# Patient Record
Sex: MALE | Race: WHITE | URBAN - METROPOLITAN AREA
[De-identification: names, ages, dates, MRNs, and addresses within clinical notes are randomized per-mention and may not be internally consistent; named-entity substitution may affect disease eponyms.]

---

## 2018-02-26 ENCOUNTER — OFFICE VISIT (OUTPATIENT)
Dept: INTERNAL MEDICINE CLINIC | Age: 33
End: 2018-02-26

## 2018-02-26 VITALS
RESPIRATION RATE: 16 BRPM | WEIGHT: 164 LBS | SYSTOLIC BLOOD PRESSURE: 111 MMHG | HEART RATE: 82 BPM | DIASTOLIC BLOOD PRESSURE: 77 MMHG | HEIGHT: 70 IN | TEMPERATURE: 98.4 F | BODY MASS INDEX: 23.48 KG/M2 | OXYGEN SATURATION: 100 %

## 2018-02-26 DIAGNOSIS — K52.9 OSMOTIC DIARRHEA: ICD-10-CM

## 2018-02-26 DIAGNOSIS — E86.0 DEHYDRATION: ICD-10-CM

## 2018-02-26 DIAGNOSIS — M79.12 STERNOCLEIDOMASTOID MUSCLE TENDERNESS: Primary | ICD-10-CM

## 2018-02-26 RX ORDER — BACLOFEN 20 MG/1
20 TABLET ORAL 3 TIMES DAILY
Qty: 90 TAB | Refills: 1 | Status: SHIPPED | OUTPATIENT
Start: 2018-02-26 | End: 2018-03-13

## 2018-02-26 NOTE — PROGRESS NOTES
FAMILY MEDICINE CLINIC NOTE    S: The patient presents for establishment of care. States that he has dizziness, severe headache and a rush of blood to the head sometimes associated with vomiting. Happens approximately 3 times per month. Always happens in the monring within 10 minutes of waking up. Patient does not snore or have nocturnal apneic episodes. States that he has persistent daytime somnolence and never sleeps well. The patient also reports hemorrhoids that come and go, reports diarrhea, the hemorrhoids are helped somewhat by preparation H. Notes a spot in the throat area for the last 8 months, no difficulty swallowing no fever. Patient does not not consuming water much at all, he mainly drinks soft drinks and coffee. 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 Denies slurred speech, focal weakness, visual or auditory changes    No current outpatient prescriptions on file prior to visit. No current facility-administered medications on file prior to visit. History reviewed. No pertinent past medical history. Social History     Social History    Marital status: SINGLE     Spouse name: N/A    Number of children: N/A    Years of education: N/A     Occupational History    Not on file.      Social History Main Topics    Smoking status: Current Every Day Smoker     Packs/day: 1.00    Smokeless tobacco: Never Used    Alcohol use 2.4 oz/week     2 Glasses of wine, 2 Shots of liquor per week    Drug use: 8.00 per week     Special: Marijuana    Sexual activity: Not Currently     Other Topics Concern    Not on file     Social History Narrative    No narrative on file       Family History   Problem Relation Age of Onset    Hypertension Mother     Arthritis-osteo Mother     Hypertension Father     Arthritis-osteo Father     Arthritis-osteo Sister        O:  Visit Vitals    /77 (BP 1 Location: Left arm, BP Patient Position: Sitting)  Pulse 82    Temp 98.4 °F (36.9 °C) (Oral)    Resp 16    Ht 5' 10\" (1.778 m)    Wt 164 lb (74.4 kg)    SpO2 100%    BMI 23.53 kg/m2     NAD, comfortable  RRR, no murmurs  CTABL, no wheezing/ronchi/rales  abd soft ND NT normoactive BS  Significant spasms of bilateral sternocleidomastoid muscle and bilateral superior trapezius    27 y.o. male      ICD-10-CM ICD-9-CM    1. Sternocleidomastoid muscle tenderness M79.1 729.1 baclofen (LIORESAL) 20 mg tablet  Recommended deep tissue massage  Follow up in 2 weeks, if no improvement will consider sleep study. 2. Osmotic diarrhea K52.9 787.91 Recommended cessation of soft drinks  Follow up in 2 weeks, if no improvement will consider gastroenterology referral   3.  Dehydration E86.0 276.51 Recommended limitation of caffeinated beverages to no more than 1 per day  Recommended increase in daily water consumption

## 2018-02-26 NOTE — MR AVS SNAPSHOT
[facility] 
 
 
 [address] [place] [address] 
[phone] Patient: Emerson Fall MRN: [id number] ENK:0/0/9345 Visit Information Date & Time Provider Department Dept. Phone Encounter #  
 2/26/2018  5:30 PM [address] [phone] [id number] Your Appointments 3/12/2018  5:45 PM  
Office Visit with MD [address] [facility]) Appt Note: 2 week f/u  
 [address]  
  
   
 [address] [facility] Upcoming Health Maintenance Date Due DTaP/Tdap/Td series (1 - Tdap) 7/4/2004 Allergies as of 2/26/2018  Review Complete On: 2/26/2018 By: Luisa Islas LPN Not on File Current Immunizations  Never Reviewed No immunizations on file. Not reviewed this visit You Were Diagnosed With   
  
 Codes Comments Sternocleidomastoid muscle tenderness    -  Primary ICD-10-CM: M79.1 ICD-9-CM: 729.1 Osmotic diarrhea     ICD-10-CM: K52.9 ICD-9-CM: 787.91 Dehydration     ICD-10-CM: E86.0 ICD-9-CM: 276.51 Vitals BP Pulse Temp Resp Height(growth percentile) Weight(growth percentile) 111/77 (BP 1 Location: Left arm, BP Patient Position: Sitting) 82 98.4 °F (36.9 °C) (Oral) 16 5' 10\" (1.778 m) 164 lb (74.4 kg) SpO2 BMI Smoking Status 100% 23.53 kg/m2 Current Every Day Smoker Vitals History BMI and BSA Data Body Mass Index Body Surface Area  
 23.53 kg/m 2 1.92 m 2 Preferred Pharmacy Pharmacy Name Phone [facility] [facility], [address] [place] [address] [phone] Your Updated Medication List  
  
   
This list is accurate as of 2/26/18  6:18 PM.  Always use your most recent med list.  
  
  
  
  
 baclofen 20 mg tablet Commonly known as:  LIORESAL Take 1 Tab by mouth three (3) times daily. Prescriptions Sent to Pharmacy Refills  
 baclofen (LIORESAL) 20 mg tablet 1 Sig: Take 1 Tab by mouth three (3) times daily. Class: Normal  
 Pharmacy: [facility] [address] [place] [address], [address] [place] [address] #: [phone] Route: Oral  
  
Patient Instructions Deep tissue neck massage 30 min once per week for at least 2 sessions at [facility], first floor of [place] [facility] [employer] Stop all sweetened drinks No more than 1 cup of coffee daily Baclofen (muscle relaxer) at night before bed Drink at least 6-8 bottles of water daily Come back in 2 weeks. Introducing [facility] & HEALTH SERVICES! [employer] introduces [employer] patient portal. Now you can access parts of your medical record, email your doctor's office, and request medication refills online. 1. In your internet browser, go to [email or web address] [employer] 2. Click on the First Time User? Click Here link in the Sign In box. You will see the New Member Sign Up page. 3. Enter your [employer] Access Code exactly as it appears below. You will not need to use this code after youve completed the sign-up process. If you do not sign up before the expiration date, you must request a new code. · [employer] Access Code: [email or web address] Expires: 5/27/2018  6:11 PM 
 
4. Enter the last four digits of your Social Security Number (xxxx) and Date of Birth (mm/dd/yyyy) as indicated and click Submit. You will be taken to the next sign-up page. 5. Create a [employer] ID. This will be your [employer] login ID and cannot be changed, so think of one that is secure and easy to remember. 6. Create a [employer] password. You can change your password at any time. 7. Enter your Password Reset Question and Answer. This can be used at a later time if you forget your password. 8. Enter your e-mail address. You will receive e-mail notification when new information is available in [address] 9. Click Sign Up. You can now view and download portions of your medical record. 10. Click the Download Summary menu link to download a portable copy of your medical information. If you have questions, please visit the Frequently Asked Questions section of the [employer] website. Remember, [employer] is NOT to be used for urgent needs. For medical emergencies, dial 911. Now available from your iPhone and Android! Please provide this summary of care documentation to your next provider. If you have any questions after today's visit, please call [phone].

## 2018-02-26 NOTE — PROGRESS NOTES
Airam Turner presents today for   Chief Complaint   Patient presents with    New Patient     Pt stated he did not have a previous provider. Airam Turner preferred language for health care discussion is english/other. Is someone accompanying this pt? Yes; mother    Is the patient using any DME equipment during [address]? No     Depression Screening:  PHQ over the last two weeks 2/26/2018   Little interest or pleasure in doing things Not at all   Feeling down, depressed or hopeless Not at all   Total Score PHQ 2 0       Learning Assessment:  Completed     Abuse Screening:  Completed    Fall Risk  No flowsheet data found. Health Maintenance reviewed and discussed per provider. Yes    Airam Turner is due for tdap. Please order/place referral if appropriate. Advance Directive:  1. Do you have an advance directive in place? Patient Reply: No     2. If not, would you like material regarding how to put one in place? Patient Reply: Yes    Coordination of Care:  1. Have you been to the ER, urgent care clinic since your last 60 days? Hospitalized since your last 60 days ? [facility] for hand pain x right     2. Have you seen or consulted any other health care providers outside of the [address] [place] [address] since your last 60 days?  No

## 2018-02-26 NOTE — PATIENT INSTRUCTIONS
Deep tissue neck massage 30 min once per week for at least 2 sessions at [facility], first floor of [place] by [employer]    Stop all sweetened drinks    No more than 1 cup of coffee daily    Baclofen (muscle relaxer) at night before bed    Drink at least 6-8 bottles of water daily    Come back in 2 weeks.

## 2018-03-12 ENCOUNTER — OFFICE VISIT (OUTPATIENT)
Dept: INTERNAL MEDICINE CLINIC | Age: 33
End: 2018-03-12

## 2018-03-12 VITALS
DIASTOLIC BLOOD PRESSURE: 68 MMHG | SYSTOLIC BLOOD PRESSURE: 116 MMHG | HEIGHT: 70 IN | TEMPERATURE: 98.5 F | BODY MASS INDEX: 23.48 KG/M2 | WEIGHT: 164 LBS | RESPIRATION RATE: 16 BRPM | HEART RATE: 76 BPM | OXYGEN SATURATION: 98 %

## 2018-03-12 DIAGNOSIS — M62.838 NECK MUSCLE SPASM: Primary | ICD-10-CM

## 2018-03-12 RX ORDER — CYCLOBENZAPRINE HCL 10 MG
10 TABLET ORAL
Qty: 90 TAB | Refills: 1 | Status: SHIPPED | OUTPATIENT
Start: 2018-03-12

## 2018-03-12 NOTE — PROGRESS NOTES
Arabella Palacio presents today for   Chief Complaint   Patient presents with    Muscle Pain     Sternocleidomastoid muscle tenderness       Arabella Palacio preferred language for health care discussion is english/other. Is someone accompanying this pt? No     Is the patient using any DME equipment during OV? No     Depression Screening:  PHQ over the last two weeks 3/12/2018 2/26/2018   Little interest or pleasure in doing things Several days Not at all   Feeling down, depressed or hopeless Several days Not at all   Total Score PHQ 2 2 0       Learning Assessment:  Learning Assessment 2/26/2018   PRIMARY LEARNER Patient   HIGHEST LEVEL OF EDUCATION - PRIMARY LEARNER  2 YEARS OF COLLEGE   BARRIERS PRIMARY LEARNER NONE   CO-LEARNER CAREGIVER No   PRIMARY LANGUAGE ENGLISH   LEARNER PREFERENCE PRIMARY DEMONSTRATION   ANSWERED BY patient   RELATIONSHIP SELF       Abuse Screening:  Abuse Screening Questionnaire 2/26/2018   Do you ever feel afraid of your partner? N   Are you in a relationship with someone who physically or mentally threatens you? N   Is it safe for you to go home? Y       Fall Risk  No flowsheet data found. Health Maintenance reviewed and discussed per provider. Yes    Arabella Palacio is due for pneumo and tdap. Please order/place referral if appropriate. Advance Directive:  1. Do you have an advance directive in place? Patient Reply: No     2. If not, would you like material regarding how to put one in place? Patient Reply:  No     Coordination of Care:  1. Have you been to the ER, urgent care clinic since your last visit? Hospitalized since your last visit? No     2. Have you seen or consulted any other health care providers outside of the [facility] since your last visit?   No

## 2018-03-12 NOTE — ACP (ADVANCE CARE PLANNING)
Advance Directive:  1. Do you have an advance directive in place? Patient Reply: No     2. If not, would you like material regarding how to put one in place?  Patient Reply:  No

## 2018-03-12 NOTE — PROGRESS NOTES
FAMILY MEDICINE CLINIC NOTE    S: The patient presents for follow up on his dizzy spells and headaches secondary to neck muscle spasms. The patient states that his morning dizzy spells have resolved since eliminating sweet drinks, increasing water consumption and decreasing caffeine consumption    His headaches have decreased in intensity but are still there, he has yet to get deep tissue massge as recommended. Baclofen helps with the headaches buit he feels like the medication gives him dry mouth    Current Outpatient Prescriptions on File Prior to Visit   Medication Sig Dispense Refill    baclofen (LIORESAL) 20 mg tablet Take 1 Tab by mouth three (3) times daily. 90 Tab 1     No current facility-administered medications on file prior to visit. History reviewed. No pertinent past medical history. Social History     Social History    Marital status: SINGLE     Spouse name: N/A    Number of children: N/A    Years of education: N/A     Occupational History    Not on file. Social History Main Topics    Smoking status: Current Every Day Smoker     Packs/day: 1.00    Smokeless tobacco: Never Used    Alcohol use 2.4 oz/week     2 Glasses of wine, 2 Shots of liquor per week    Drug use: 8.00 per week     Special: Marijuana    Sexual activity: Not Currently     Other Topics Concern    Not on file     Social History Narrative       Family History   Problem Relation Age of Onset    Hypertension Mother     Arthritis-osteo Mother     Hypertension Father     Arthritis-osteo Father     Arthritis-osteo Sister        O:  Visit Vitals    /68 (BP 1 Location: Left arm, BP Patient Position: Sitting)    Pulse 76    Temp 98.5 °F (36.9 °C) (Oral)    Resp 16    Ht 5' 10\" (1.778 m)    Wt 164 lb (74.4 kg)    SpO2 98%    BMI 23.53 kg/m2     NAD, comfortable  RRR, no murmurs  CTABL, no wheezing/ronchi/rales  Spasms and mod TTP of bilateral superior trapezius and sternocleidomastoid muscles. 27 y.o. male      ICD-10-CM ICD-9-CM    1.  Neck muscle spasm M62.838 728.85 cyclobenzaprine (FLEXERIL) 10 mg tablet  Discontinue baclofen

## 2018-03-12 NOTE — MR AVS SNAPSHOT
[facility] 
 
 
 [address] [facility] 83 [address] 
[phone] Patient: Radha Duran MRN: [id number] RAP:7/8/[birth year] Visit Information Date & Time Provider Department Dept. Phone Encounter #  
 3/12/2018  5:30 PM [facility] [phone] [id number] Upcoming Health Maintenance Date Due Pneumococcal 19-64 Medium Risk (1 of 1 - PPSV23) 7/4/2002 DTaP/Tdap/Td series (1 - Tdap) 7/4/2004 Allergies as of 3/12/2018  Review Complete On: 3/12/2018 By: Rustam Silva LPN No Known Allergies Current Immunizations  Never Reviewed No immunizations on file. Not reviewed this visit You Were Diagnosed With   
  
 Codes Comments Neck muscle spasm    -  Primary ICD-10-CM: M63.254 ICD-9-CM: 728.85 Vitals BP Pulse Temp Resp Height(growth percentile) Weight(growth percentile) 116/68 (BP 1 Location: Left arm, BP Patient Position: Sitting) 76 98.5 °F (36.9 °C) (Oral) 16 5' 10\" (1.778 m) 164 lb (74.4 kg) SpO2 BMI Smoking Status 98% 23.53 kg/m2 Current Every Day Smoker Vitals History BMI and BSA Data Body Mass Index Body Surface Area  
 23.53 kg/m 2 1.92 m 2 Preferred Pharmacy Pharmacy Name Phone [facility] 3 [address] [phone] Your Updated Medication List  
  
   
This list is accurate as of 3/12/18  6:02 PM.  Always use your most recent med list.  
  
  
  
  
 baclofen 20 mg tablet Commonly known as:  LIORESAL Take 1 Tab by mouth three (3) times daily. cyclobenzaprine 10 mg tablet Commonly known as:  FLEXERIL Take 1 Tab by mouth three (3) times daily as needed for Muscle Spasm(s). Prescriptions Sent to Pharmacy  Refills  
 cyclobenzaprine (FLEXERIL) 10 mg tablet 1  
 Sig: Take 1 Tab by mouth three (3) times daily as needed for Muscle Spasm(s). Class: Normal  
 Pharmacy: [facility], [address] [place] [address] #: [phone] Route: Oral  
  
Introducing [facility] & HEALTH SERVICES! [employer] introduces [employer] patient portal. Now you can access parts of your medical record, email your doctor's office, and request medication refills online. 1. In your internet browser, go to [email or web address] 2. Click on the First Time User? Click Here link in the Sign In box. You will see the New Member Sign Up page. 3. Enter your [employer] Access Code exactly as it appears below. You will not need to use this code after youve completed the sign-up process. If you do not sign up before the expiration date, you must request a new code. · [employer] Access Code: [email or web address] Expires: 5/27/2018  7:11 PM 
 
4. Enter the last four digits of your Social Security Number (xxxx) and Date of Birth (mm/dd/yyyy) as indicated and click Submit. You will be taken to the next sign-up page. 5. Create a [employer] ID. This will be your [employer] login ID and cannot be changed, so think of one that is secure and easy to remember. 6. Create a [employer] password. You can change your password at any time. 7. Enter your Password Reset Question and Answer. This can be used at a later time if you forget your password. 8. Enter your e-mail address. You will receive e-mail notification when new information is available in [address] 9. Click Sign Up. You can now view and download portions of your medical record. 10. Click the Download Summary menu link to download a portable copy of your medical information. If you have questions, please visit the Frequently Asked Questions section of the [employer] website. Remember, [employer] is NOT to be used for urgent needs. For medical emergencies, dial 911. Now available from your iPhone and Android! Please provide this summary of care documentation to your next provider. If you have any questions after today's visit, please call [phone].

## 2018-06-06 DIAGNOSIS — M79.12 STERNOCLEIDOMASTOID MUSCLE TENDERNESS: ICD-10-CM

## 2018-06-06 RX ORDER — BACLOFEN 20 MG/1
TABLET ORAL
Qty: 90 TAB | Refills: 0 | Status: SHIPPED | OUTPATIENT
Start: 2018-06-06

## 2018-06-11 ENCOUNTER — TELEPHONE (OUTPATIENT)
Dept: INTERNAL MEDICINE CLINIC | Age: 33
End: 2018-06-11

## 2018-06-11 ENCOUNTER — HOSPITAL ENCOUNTER (OUTPATIENT)
Dept: LAB | Age: 33
Discharge: HOME | End: 2018-06-11
Payer: COMMERCIAL

## 2018-06-11 ENCOUNTER — OFFICE VISIT (OUTPATIENT)
Dept: INTERNAL MEDICINE CLINIC | Age: 33
End: 2018-06-11

## 2018-06-11 VITALS
TEMPERATURE: 97.8 F | DIASTOLIC BLOOD PRESSURE: 75 MMHG | HEIGHT: 70 IN | RESPIRATION RATE: 17 BRPM | SYSTOLIC BLOOD PRESSURE: 114 MMHG | BODY MASS INDEX: 23.11 KG/M2 | WEIGHT: 161.4 LBS | HEART RATE: 70 BPM | OXYGEN SATURATION: 99 %

## 2018-06-11 DIAGNOSIS — Z11.3 SCREEN FOR STD (SEXUALLY TRANSMITTED DISEASE): ICD-10-CM

## 2018-06-11 DIAGNOSIS — Z00.00 HEALTH CARE MAINTENANCE: Primary | ICD-10-CM

## 2018-06-11 PROCEDURE — 86592 SYPHILIS TEST NON-TREP QUAL: CPT | Performed by: FAMILY MEDICINE

## 2018-06-11 PROCEDURE — 87389 HIV-1 AG W/HIV-1&-2 AB AG IA: CPT | Performed by: FAMILY MEDICINE

## 2018-06-11 NOTE — MR AVS SNAPSHOT
[facility] 
 
 
 [address] [facility] 83 [address] 
[phone] Patient: Rivas Schmidt MRN: [id number] EIY:4[birth year] Visit Information Date & Time Provider Department Dept. Phone Encounter #  
 6/11/2018  5:30 PM Radha Campo, [address] [phone] [id number] Upcoming Health Maintenance Date Due Pneumococcal 19-64 Medium Risk (1 of 1 - PPSV23) 7/4/2002 DTaP/Tdap/Td series (1 - Tdap) 7/4/2004 Influenza Age 5 to Adult 8/1/2018 Allergies as of 6/11/2018  Review Complete On: 6/11/2018 By: Radha Campo MD  
 No Known Allergies Current Immunizations  Never Reviewed No immunizations on file. Not reviewed this visit You Were Diagnosed With   
  
 Codes Comments Health care maintenance    -  Primary ICD-10-CM: Z00.00 ICD-9-CM: V70.0 Screen for STD (sexually transmitted disease)     ICD-10-CM: Z11.3 ICD-9-CM: V74.5 Vitals BP Pulse Temp Resp Height(growth percentile) Weight(growth percentile) 114/75 (BP 1 Location: Left arm, BP Patient Position: Sitting) 70 97.8 °F (36.6 °C) (Oral) 17 5' 10\" (1.778 m) 161 lb 6.4 oz (73.2 kg) SpO2 BMI Smoking Status 99% 23.16 kg/m2 Current Every Day Smoker Vitals History BMI and BSA Data Body Mass Index Body Surface Area  
 23.16 kg/m 2 1.9 m 2 Preferred Pharmacy Pharmacy Name Phone [facility] 933 [facility], [address] [place] [address] [phone] Your Updated Medication List  
  
   
This list is accurate as of 6/11/18  5:46 PM.  Always use your most recent med list.  
  
  
  
  
 baclofen 20 mg tablet Commonly known as:  LIORESAL  
TAKE 1 TABLET BY MOUTH THREE TIMES DAILY  
  
 cyclobenzaprine 10 mg tablet Commonly known as:  FLEXERIL Take 1 Tab by mouth three (3) times daily as needed for Muscle Spasm(s).   
  
  
  
  
To-Do List   
 06/11/2018 Lab:  CHLAMYDIA/GC PCR   
  
 06/11/2018 Lab:  HIV 1/2 AG/AB, 4TH GENERATION,W RFLX CONFIRM   
  
 06/11/2018 Lab:  RPR Introducing [facility] & HEALTH SERVICES! [employer] introduces [employer] patient portal. Now you can access parts of your medical record, email your doctor's office, and request medication refills online. 1. In your internet browser, go to [email or web address] 2. Click on the First Time User? Click Here link in the Sign In box. You will see the New Member Sign Up page. 3. Enter your [employer] Access Code exactly as it appears below. You will not need to use this code after youve completed the sign-up process. If you do not sign up before the expiration date, you must request a new code. · [employer] Access Code: [email or web address] Expires: 9/9/2018  5:44 PM 
 
4. Enter the last four digits of your Social Security Number (xxxx) and Date of Birth (mm/dd/yyyy) as indicated and click Submit. You will be taken to the next sign-up page. 5. Create a [employer] ID. This will be your [employer] login ID and cannot be changed, so think of one that is secure and easy to remember. 6. Create a [employer] password. You can change your password at any time. 7. Enter your Password Reset Question and Answer. This can be used at a later time if you forget your password. 8. Enter your e-mail address. You will receive e-mail notification when new information is available in [address] 9. Click Sign Up. You can now view and download portions of your medical record. 10. Click the Download Summary menu link to download a portable copy of your medical information. If you have questions, please visit the Frequently Asked Questions section of the [employer] website. Remember, [employer] is NOT to be used for urgent needs. For medical emergencies, dial 911. Now available from your iPhone and Android! Please provide this summary of care documentation to your next provider. If you have any questions after today's visit, please call [phone].

## 2018-06-11 NOTE — PROGRESS NOTES
FAMILY MEDICINE CLINIC NOTE    S: The patient presents for a general physical. He has no specific somatic complaints, no significant PMH, taking no medications, NKDA. Current Outpatient Prescriptions on File Prior to Visit   Medication Sig Dispense Refill    baclofen (LIORESAL) 20 mg tablet TAKE 1 TABLET BY MOUTH THREE TIMES DAILY 90 Tab 0    cyclobenzaprine (FLEXERIL) 10 mg tablet Take 1 Tab by mouth three (3) times daily as needed for Muscle Spasm(s). 90 Tab 1     No current facility-administered medications on file prior to visit. History reviewed. No pertinent past medical history. Social History     Social History    Marital status: SINGLE     Spouse name: N/A    Number of children: N/A    Years of education: N/A     Occupational History    Not on file.      Social History Main Topics    Smoking status: Current Every Day Smoker     Packs/day: 1.00    Smokeless tobacco: Never Used    Alcohol use 2.4 oz/week     2 Glasses of wine, 2 Shots of liquor per week    Drug use: 8.00 per week     Special: Marijuana    Sexual activity: Not Currently     Other Topics Concern    Not on file     Social History Narrative       Family History   Problem Relation Age of Onset    Hypertension Mother     Arthritis-osteo Mother     Hypertension Father     Arthritis-osteo Father    [de-identified] Arthritis-osteo Sister        O:  Visit Vitals    /75 (BP 1 Location: Left arm, BP Patient Position: Sitting)    Pulse 70    Temp 97.8 °F (36.6 °C) (Oral)    Resp 17    Ht 5' 10\" (1.778 m)    Wt 161 lb 6.4 oz (73.2 kg)    SpO2 99%    BMI 23.16 kg/m2     Physical Examination: General appearance - alert, well appearing, and in no distress  Mental status - alert, oriented to person, place, and time  Eyes - pupils equal and reactive, extraocular eye movements intact  Ears - bilateral TM's and external ear canals normal  Nose - normal and patent, no erythema, discharge or polyps  Mouth - mucous membranes moist, pharynx normal without lesions  Neck - supple, no significant adenopathy  Lymphatics - no palpable lymphadenopathy, no hepatosplenomegaly  Chest - clear to auscultation, no wheezes, rales or rhonchi, symmetric air entry  Heart - normal rate, regular rhythm, normal S1, S2, no murmurs, rubs, clicks or gallops  Abdomen - soft, nontender, nondistended, no masses or organomegaly  Back exam - full range of motion, no tenderness, palpable spasm or pain on motion  Neurological - alert, oriented, normal speech, no focal findings or movement disorder noted  Musculoskeletal - no joint tenderness, deformity or swelling  Extremities - peripheral pulses normal, no pedal edema, no clubbing or cyanosis  Skin - normal coloration and turgor, no rashes, no suspicious skin lesions noted      27 y.o. male      ICD-10-CM ICD-9-CM    1. Health care maintenance Z00.00 V70.0 Encouraged smoking cessation   2.  Screen for STD (sexually transmitted disease) Z11.3 V74.5 HIV 1/2 AG/AB, 4TH GENERATION,W RFLX CONFIRM      RPR      CHLAMYDIA/GC PCR

## 2018-06-11 NOTE — TELEPHONE ENCOUNTER
Contacted pt. 2 pt identifiers confirmed. Pt has appt this afternoon at 1730 with Dr. Abdoulaye Castro. Pt asked if he could come in earlier. Pt states yes, he will come in around 1700. Verbalized understanding. No further questions or concerns at this time.

## 2018-06-11 NOTE — PROGRESS NOTES
ROOM # 8    Nikita Pires presents today for   Chief Complaint   Patient presents with    Complete Physical       Nikita Pires preferred language for health care discussion is english/other. Is someone accompanying this pt? no    Is the patient using any DME equipment during OV? no    Depression Screening:  PHQ over the last two weeks 6/11/2018 3/12/2018 2/26/2018   Little interest or pleasure in doing things Not at all Several days Not at all   Feeling down, depressed or hopeless Not at all Several days Not at all   Total Score PHQ 2 0 2 0       Learning Assessment:  Learning Assessment 2/26/2018   PRIMARY LEARNER Patient   HIGHEST LEVEL OF EDUCATION - PRIMARY LEARNER  2 YEARS [facility] PRIMARY LEARNER NONE   CO-LEARNER CAREGIVER No   PRIMARY LANGUAGE ENGLISH   LEARNER PREFERENCE PRIMARY DEMONSTRATION   ANSWERED BY patient   RELATIONSHIP SELF       Abuse Screening:  Abuse Screening Questionnaire 2/26/2018   Do you ever feel afraid of your partner? N   Are you in a relationship with someone who physically or mentally threatens you? N   Is it safe for you to go home? Y       Fall Risk  No flowsheet data found. Health Maintenance reviewed and discussed per provider. Yes    Nikita Pires is due for   Health Maintenance Due   Topic Date Due    Pneumococcal 19-64 Medium Risk (1 of 1 - PPSV23) 07/04/2002    DTaP/Tdap/Td series (1 - Tdap) 07/04/2004   HM to be reviewed by provider    Please order/place referral if appropriate. Advance Directive:  1. Do you have an advance directive in place? Patient Reply: no    2. If not, would you like material regarding how to put one in place? Patient Reply: no    Coordination of Care:  1. Have you been to the ER, urgent care clinic since your last visit? Hospitalized since your last visit? no    2. Have you seen or consulted any other health care providers outside of the [address] [place] [address] since your last visit?  Include any pap smears or colon screening.  no

## 2018-06-12 ENCOUNTER — HOSPITAL ENCOUNTER (OUTPATIENT)
Dept: LAB | Age: 33
Discharge: HOME | End: 2018-06-12

## 2018-06-12 DIAGNOSIS — Z11.3 SCREEN FOR STD (SEXUALLY TRANSMITTED DISEASE): ICD-10-CM

## 2018-06-12 LAB — RPR SER QL: NONREACTIVE

## 2018-06-13 LAB
HIV 1+2 AB+HIV1 P24 AG SERPL QL IA: NONREACTIVE
HIV12 RESULT COMMENT, HHIVC: NORMAL

## 2019-07-15 ENCOUNTER — OFFICE VISIT (OUTPATIENT)
Dept: INTERNAL MEDICINE CLINIC | Age: 34
End: 2019-07-15

## 2019-07-15 ENCOUNTER — HOSPITAL ENCOUNTER (OUTPATIENT)
Dept: LAB | Age: 34
Discharge: HOME | End: 2019-07-15
Payer: COMMERCIAL

## 2019-07-15 VITALS
SYSTOLIC BLOOD PRESSURE: 121 MMHG | OXYGEN SATURATION: 98 % | WEIGHT: 159 LBS | BODY MASS INDEX: 22.76 KG/M2 | DIASTOLIC BLOOD PRESSURE: 80 MMHG | HEIGHT: 70 IN | HEART RATE: 78 BPM | TEMPERATURE: 97.4 F | RESPIRATION RATE: 16 BRPM

## 2019-07-15 DIAGNOSIS — Z00.00 ROUTINE GENERAL MEDICAL EXAMINATION AT A HEALTH CARE FACILITY: Primary | ICD-10-CM

## 2019-07-15 DIAGNOSIS — Z23 ENCOUNTER FOR IMMUNIZATION: ICD-10-CM

## 2019-07-15 DIAGNOSIS — R42 DIZZINESS: ICD-10-CM

## 2019-07-15 DIAGNOSIS — R06.02 SOB (SHORTNESS OF BREATH): ICD-10-CM

## 2019-07-15 DIAGNOSIS — R00.2 PALPITATIONS: ICD-10-CM

## 2019-07-15 DIAGNOSIS — Z00.00 ROUTINE GENERAL MEDICAL EXAMINATION AT A HEALTH CARE FACILITY: ICD-10-CM

## 2019-07-15 DIAGNOSIS — G47.19 EXCESSIVE DAYTIME SLEEPINESS: ICD-10-CM

## 2019-07-15 LAB
ALBUMIN SERPL-MCNC: 4.4 G/DL (ref 3.4–5)
ALBUMIN/GLOB SERPL: 1.4 {RATIO} (ref 0.8–1.7)
ALP SERPL-CCNC: 109 U/L (ref 45–117)
ALT SERPL-CCNC: 23 U/L (ref 16–61)
ANION GAP SERPL CALC-SCNC: 5 MMOL/L (ref 3–18)
AST SERPL-CCNC: 17 U/L (ref 15–37)
BILIRUB SERPL-MCNC: 0.4 MG/DL (ref 0.2–1)
BUN SERPL-MCNC: 15 MG/DL (ref 7–18)
BUN/CREAT SERPL: 10 (ref 12–20)
CALCIUM SERPL-MCNC: 9 MG/DL (ref 8.5–10.1)
CHLORIDE SERPL-SCNC: 107 MMOL/L (ref 100–108)
CHOLEST SERPL-MCNC: 199 MG/DL
CO2 SERPL-SCNC: 27 MMOL/L (ref 21–32)
CREAT SERPL-MCNC: 1.54 MG/DL (ref 0.6–1.3)
ERYTHROCYTE [DISTWIDTH] IN BLOOD BY AUTOMATED COUNT: 13.3 % (ref 11.6–14.5)
GLOBULIN SER CALC-MCNC: 3.1 G/DL (ref 2–4)
GLUCOSE SERPL-MCNC: 85 MG/DL (ref 74–99)
HBA1C MFR BLD: 5.1 % (ref 4.2–5.6)
HCT VFR BLD AUTO: 43.4 % (ref 36–48)
HDLC SERPL-MCNC: 50 MG/DL (ref 40–60)
HDLC SERPL: 4 {RATIO} (ref 0–5)
HGB BLD-MCNC: 14.8 G/DL (ref 13–16)
LDLC SERPL CALC-MCNC: 123.4 MG/DL (ref 0–100)
LIPID PROFILE,FLP: ABNORMAL
MAGNESIUM SERPL-MCNC: 2.1 MG/DL (ref 1.6–2.6)
MCH RBC QN AUTO: 30.5 PG (ref 24–34)
MCHC RBC AUTO-ENTMCNC: 34.1 G/DL (ref 31–37)
MCV RBC AUTO: 89.3 FL (ref 74–97)
PLATELET # BLD AUTO: 345 K/UL (ref 135–420)
PMV BLD AUTO: 10.7 FL (ref 9.2–11.8)
POTASSIUM SERPL-SCNC: 3.9 MMOL/L (ref 3.5–5.5)
PROT SERPL-MCNC: 7.5 G/DL (ref 6.4–8.2)
RBC # BLD AUTO: 4.86 M/UL (ref 4.7–5.5)
SODIUM SERPL-SCNC: 139 MMOL/L (ref 136–145)
T4 FREE SERPL-MCNC: 1 NG/DL (ref 0.7–1.5)
TRIGL SERPL-MCNC: 128 MG/DL (ref ?–150)
TSH SERPL DL<=0.05 MIU/L-ACNC: 0.42 UIU/ML (ref 0.36–3.74)
VLDLC SERPL CALC-MCNC: 25.6 MG/DL
WBC # BLD AUTO: 11.5 K/UL (ref 4.6–13.2)

## 2019-07-15 PROCEDURE — 83036 HEMOGLOBIN GLYCOSYLATED A1C: CPT

## 2019-07-15 PROCEDURE — 80053 COMPREHEN METABOLIC PANEL: CPT

## 2019-07-15 PROCEDURE — 36415 COLL VENOUS BLD VENIPUNCTURE: CPT

## 2019-07-15 PROCEDURE — 84443 ASSAY THYROID STIM HORMONE: CPT

## 2019-07-15 PROCEDURE — 83735 ASSAY OF MAGNESIUM: CPT

## 2019-07-15 PROCEDURE — 80061 LIPID PANEL: CPT

## 2019-07-15 PROCEDURE — 85027 COMPLETE CBC AUTOMATED: CPT

## 2019-07-15 PROCEDURE — 84439 ASSAY OF FREE THYROXINE: CPT

## 2019-07-15 NOTE — PROGRESS NOTES
Presented for Pneumococcal-23 and Tdap Vaccine. Administered in left deltoid without complaints. Pt observed for 5 min. No adverse reaction noted.  Pt left office in stable condition

## 2019-07-15 NOTE — PROGRESS NOTES
HISTORY OF PRESENT ILLNESS  Carlton Ramey is a 37 y.o. male. HPI  Mr. Alma Morales presents for a physical. He is not fasting today. 1) Girlfriend and 1-year have strep. Denies sore throat. 2) He c/o frequent (2-4x per week x 1 year) episodes of heart racing, darker vision, cloudy hearing, and SOB. Episodes typically last 5-10 minutes but have lasted as long as 30-45 minutes. Often symptoms are accompanied by a headache. Symptoms all seem to resolve without sequelae spontaneously. He has not previously received evaluation of this. Episodes are always in the morning after waking. They sometimes occur after he has risen from bed but have also occurred while he is still laying down. Review of Systems   Constitutional: Negative for chills. HENT: Negative for sore throat. Respiratory: Positive for shortness of breath. Cardiovascular: Positive for chest pain and palpitations. Neurological: Positive for dizziness and headaches. Visit Vitals  /80 (BP 1 Location: Right arm, BP Patient Position: Sitting)   Pulse 78   Temp 97.4 °F (36.3 °C) (Oral)   Resp 16   Ht 5' 10\" (1.778 m)   Wt 159 lb (72.1 kg)   SpO2 98%   BMI 22.81 kg/m²       Physical Exam   Constitutional: He is oriented to person, place, and time. He appears well-developed and well-nourished. No distress. HENT:   Head: Normocephalic and atraumatic. Right Ear: Tympanic membrane, external ear and ear canal normal.   Left Ear: Tympanic membrane, external ear and ear canal normal.   Nose: Nose normal.   Mouth/Throat: Uvula is midline, oropharynx is clear and moist and mucous membranes are normal. No oropharyngeal exudate, posterior oropharyngeal edema, posterior oropharyngeal erythema or tonsillar abscesses. Eyes: Pupils are equal, round, and reactive to light. Conjunctivae are normal. No scleral icterus. Neck: Neck supple. Cardiovascular: Normal rate, regular rhythm and normal heart sounds. Exam reveals no gallop.    No murmur heard.  Pulses:       Dorsalis pedis pulses are 2+ on the right side, and 2+ on the left side. Posterior tibial pulses are 2+ on the right side, and 2+ on the left side. No pedal edema. Pulmonary/Chest: Effort normal and breath sounds normal. No respiratory distress. He has no decreased breath sounds. He has no wheezes. He has no rhonchi. He has no rales. Lymphadenopathy:        Head (right side): No submandibular and no tonsillar adenopathy present. Head (left side): No submandibular and no tonsillar adenopathy present. He has no cervical adenopathy. Right: No supraclavicular adenopathy present. Left: No supraclavicular adenopathy present. Neurological: He is alert and oriented to person, place, and time. Skin: Skin is warm and dry. Psychiatric: He has a normal mood and affect. His speech is normal.       ASSESSMENT and PLAN  Diagnoses and all orders for this visit:    1. Routine general medical examination at a health care facility  -     CBC W/O DIFF; Future  -     METABOLIC PANEL, COMPREHENSIVE; Future  -     LIPID PANEL; Future  -     HEMOGLOBIN A1C W/O EAG; Future    2. Palpitations  -     AMB POC EKG ROUTINE W/ 12 LEADS, INTER & REP   - EKG interp: NSR (60 bpm), Short ID syndrome.    - Self interp: NSR (60 bpm). No arrhythmia.   -     T4, FREE; Future  -     TSH 3RD GENERATION; Future  -     MAGNESIUM; Future  -     CARDIAC EVENT MONITOR; Future  -     REFERRAL TO SLEEP STUDIES    3. SOB (shortness of breath)  -     AMB POC EKG ROUTINE W/ 12 LEADS, INTER & REP  -     CARDIAC EVENT MONITOR; Future  -     REFERRAL TO SLEEP STUDIES    4. Dizziness  -     AMB POC EKG ROUTINE W/ 12 LEADS, INTER & REP  -     CARDIAC EVENT MONITOR; Future    5. Excessive daytime sleepiness  -     REFERRAL TO SLEEP STUDIES    6.  Encounter for immunization  -     TETANUS, DIPHTHERIA TOXOIDS AND ACELLULAR PERTUSSIS VACCINE (TDAP), IN INDIVIDS. >=7, IM  -     PNEUMOCOCCAL POLYSACCHARIDE VACCINE, 23-VALENT, ADULT OR IMMUNOSUPPRESSED PT DOSE,  -     MA IMMUNIZ ADMIN,1 SINGLE/COMB VAC/TOXOID      Follow-up and Dispositions    · Return for with results.

## 2019-07-15 NOTE — PROGRESS NOTES
ROOM # 13    Beryle Simpler presents today for   Chief Complaint   Patient presents with    Physical       Beryle Simpler preferred language for health care discussion is english/other. Is someone accompanying this pt? No    Is the patient using any DME equipment during OV? No    Depression Screening:  3 most recent [facility] [place] [facility] Screens 7/15/2019 6/11/2018 3/12/2018 2/26/2018   Little interest or pleasure in doing things Not at all Not at all Several days Not at all   Feeling down, depressed, irritable, or hopeless Not at all Not at all Several days Not at all   Total Score PHQ 2 0 0 2 0       Learning Assessment:  Learning Assessment 2/26/2018   PRIMARY LEARNER Patient   HIGHEST LEVEL OF EDUCATION - PRIMARY LEARNER  2 YEARS [facility] PRIMARY LEARNER NONE   CO-LEARNER CAREGIVER No   PRIMARY LANGUAGE ENGLISH   LEARNER PREFERENCE PRIMARY DEMONSTRATION   ANSWERED BY patient   RELATIONSHIP SELF       Abuse Screening:  Abuse Screening Questionnaire 2/26/2018   Do you ever feel afraid of your partner? N   Are you in a relationship with someone who physically or mentally threatens you? N   Is it safe for you to go home? Y       Fall Risk  No flowsheet data found. Health Maintenance reviewed and discussed per provider. Yes    Beryle Simpler is due for   Health Maintenance Due   Topic Date Due    Pneumococcal 0-64 years (1 of 1 - PPSV23) 07/04/1989    DTaP/Tdap/Td series (1 - Tdap) 07/04/2004     Please order/place referral if appropriate. Advance Directive:  1. Do you have an advance directive in place? Patient Reply: NO    2. If not, would you like material regarding how to put one in place? Patient Reply: NO    Coordination of Care:  1. Have you been to the ER, urgent care clinic since your last visit? Hospitalized since your last visit? NO    2. Have you seen or consulted any other health care providers outside of the [address] [place] [address] since your last visit? Include any colon screening. No

## 2019-10-07 ENCOUNTER — OFFICE VISIT (OUTPATIENT)
Dept: PULMONOLOGY | Age: 34
End: 2019-10-07

## 2019-10-07 VITALS
RESPIRATION RATE: 18 BRPM | BODY MASS INDEX: 23.51 KG/M2 | WEIGHT: 164.2 LBS | HEART RATE: 77 BPM | OXYGEN SATURATION: 99 % | SYSTOLIC BLOOD PRESSURE: 129 MMHG | DIASTOLIC BLOOD PRESSURE: 81 MMHG | HEIGHT: 70 IN | TEMPERATURE: 98.2 F

## 2019-10-07 DIAGNOSIS — Z72.821 POOR SLEEP HYGIENE: ICD-10-CM

## 2019-10-07 DIAGNOSIS — F12.90 MARIJUANA USE: ICD-10-CM

## 2019-10-07 DIAGNOSIS — F45.8 BRUXISM: ICD-10-CM

## 2019-10-07 DIAGNOSIS — Z78.9 ALCOHOL USE: ICD-10-CM

## 2019-10-07 DIAGNOSIS — G89.29 OTHER CHRONIC PAIN: ICD-10-CM

## 2019-10-07 DIAGNOSIS — G47.19 EXCESSIVE DAYTIME SLEEPINESS: Primary | ICD-10-CM

## 2019-10-07 DIAGNOSIS — R00.2 PALPITATION: ICD-10-CM

## 2019-10-07 DIAGNOSIS — G47.00 INSOMNIA, UNSPECIFIED TYPE: ICD-10-CM

## 2019-10-07 DIAGNOSIS — F17.218 CIGARETTE NICOTINE DEPENDENCE WITH OTHER NICOTINE-INDUCED DISORDER: ICD-10-CM

## 2019-10-07 DIAGNOSIS — Z65.9 OTHER SOCIAL STRESSOR: ICD-10-CM

## 2019-10-07 NOTE — PROGRESS NOTES
Pt taken in stable condition via [facility] to [facility] in [place], [place] [facility]  Pulmonary, Critical Care, and Sleep Medicine    Office Progress Note- Initial Evaluation      Primary Care Physician: David Morales     Reason for Visit:  Evaluation for possible sleep breathing disorder    Assessment:  1. Excessive Daytime Sleepiness (EDS)- suspect related to multiple factors. I have had an in depth discussion with the patient. He is able to fall asleep but not maintain sleep. He does have some anatomic features that may suggest MARIA. However I suspect that most of his sleep fragmentation is coming from his chronic pain. I have also suggested to him that for him to smoke 1 PPD and marijuana TID would suggest he is more stressed and has more anxiety than he realizes. He is also working nearly every day. All of these issues contribute to inadequate sleep time, and sleep fragmentation. It would not be unreasonable to pursue an HST but addressing his physical pain,smoking, stressors, possible anxiety and night-time caffeine intake may be more beneficial. At the end of our discussion, the patient tentatively agreed to HST - pending the cost.   2. Palpitations- possible panic attacks vs arrhythmia vs other- patient reports sudden onset, more so in the morning and is associated with flush feeling, ringing in ears and tunnel vision. Sounds like hyperventilation. Unclear if this is related to stimulate use, smoking, anxiety, other??  3. Insomnia- sleep maintenance  4. Excessive smoking and caffeine intake at night  5. Bruxism  6. Marijuana use- TID  7. Nicotine dependence: 1PPD  8. Chronic pain: muscle and joints which does disturb sleep  9. Social stressors  10. Alcohol use       Plan:  · Smoking cessation encouraged  · Attempt to optimize pain issues  · Attempt to address social stresors  · Schedule patient for home sleep study for further evaluation.   · Potential consequences of untreated sleep apnea, and/or excessive daytime sleepiness were discussed with the patient. · Educational materials provided. · Treatment options including CPAP, dental appliance, weight reduction measures, positional therapy, surgeries etc were discussed. · Healthy lifestyle changes to include weight loss and exercise discussed. · Healthy sleep habits were reviewed and encouraged. ·  and workplace safety reviewed and discussed as appropriate. Drowsy and/or inattentive driving should be avoided. · Follow-up in PRN , sooner should new symptoms or problems arise. · Follow up with Primary Care Provider (PCP) as directed and for routine health care maintenance. · Follow up with me either after HST or PRN if patient does not wish to pursue sleep testing at this time. History of Present Illness: Mr. Saleem Castro is a 37 y.o. male patient who reports for evaluation with difficulty maintaining sleep. The history was provided by the patient. Occupation:                         Work Schedule: 8490-2919 M-S- works most days with an occasional day off    Driving:  yes  Drowsy Driving: is not reported. Motor vehicle accident(s) associated with drowsy driving have not occurred. Snoring:  He is not aware of any snoring. His bed partner has not noted snoring    Fatigue: This is a Chronic problem which has been ongoing for years. Dental: Teeth clenching or grinding is reported. Naps: are reported when he can but this is rare. Leg Symptoms/Pain: He does not have unpleasant or crawling sensation in legs or strong urge to move when inactive. He struggles to find a position of comfort so he will move a lot in his sleep. GERD: is reported but not common. Mood: Somewhere between happy and sad. He has stress with his live-in girlfriend and father of his child, they may be . Sleep-Wake History:     Estimates sleeping approximately 5-6 hours per night/day. Reports sleeping in a Bed with 1 pillows. He gets into bed at approximately 2300. Once in bed,he tries to go to sleep. It usually takes up to 20-60 minutes to fall asleep after going to bed. Reports waking up to use the bathroom 0 times nightly. Pain, typically does disturb their sleep. Usually pain is related shoulders, neck and hip pain. This is often an every night occurrence. Vivid dreams are not reported. He normally awakens with an alarm (usually he will wake right before alarm goes off)  to start their day at 0630. He  typically gets out of bed after hitting the snooze button at least twice. .    Waking up with a morning headache is reported at least 1-2 times weekly    Awakening with a dry mouth is reported. Symptom(s) suggestive of cataplexy are not reported. Sleep paralysis are not reported. Hypnagogic and/or hypnopompic hallucinations are not reported. Sleep walking is not  reported currently. He use to sleep walk as a child and once locked himself out of the house. Sleep talking is reported. Other unusual and/or parasomnia behaviors are not reported. Family Sleep History:  - Father: MARIA- uses CPAP        Stop [place] Head 10/7/2019   Does the patient snore loudly (louder than talking or loud enough to be heard through closed doors)? 0   Does the patient often feel tired, fatigued, or sleepy during the daytime, even after a \"good\" night's sleep? 1   Has anyone ever observed the patient stop breathing during their sleep?  0   Does the patient have or are they being treated for high blood pressure? 0   Is the patient's BMI greater than 35? 0   Is your neck circumference greater than 17 inches (Male) or 16 inches (Female)? 0   Is the patient older than 48? 0   Is the patient male?  1   MARIA Score 2       3 most recent PHQ Screens 10/7/2019   Little interest or pleasure in doing things Not at all   Feeling down, depressed, irritable, or hopeless Not at all   Total Score PHQ 2 0       [place] Scale 10/7/2019   Sitting and Reading 0   Watching TV 0   Sitting, inactive in a public place (e.g. a movie theater or meeting) 0   As a passenger in a car for an hour, without a break 0   Lying down to rest in the afternoon, when circumstances permit 1   Sitting and talking to someone 0   Sitting quietly after lunch without alcohol 1   In a car, while stopped for a few minutes in traffic 0   [place] Sleepiness Score 2        Neck circ. in \"inches\": 14         Past Medical History:  Past Medical History:   Diagnosis Date    Chronic pain     back       Past Surgical History:  Past Surgical History:   Procedure Laterality Date    HX WISDOM TEETH EXTRACTION         Family History:  Family History   Problem Relation Age of Onset    Hypertension Mother     Arthritis-osteo Mother     Hypertension Father     Arthritis-osteo Father    Karie Peabody Arthritis-osteo Sister        Social History:  Social History     Tobacco Use    Smoking status: Current Every Day Smoker     Packs/day: 1.00     Years: 20.00     Pack years: 20.00    Smokeless tobacco: Never Used   Substance Use Topics    Alcohol use: Yes     Alcohol/week: 4.0 standard drinks     Types: 2 Glasses of wine, 2 Shots of liquor per week    Drug use: Yes     Frequency: 8.0 times per week     Types: Marijuana        Caffeine Amount Time of last Intake Comments   Coffee 3-4/day 1200    Soda With diner     Tea 1 G QOD    Sweet    Energy Drinks None     Over- the - counter stimulant pills None     Other Substances      Alcohol 1 -2 /2days [address]   Tobacco 1 PPD x 20 Y  No vap   Drugs Marijuana: TID bowl For relaxing   Other: None         Medications:  Current Outpatient Medications on File Prior to Visit   Medication Sig Dispense Refill    baclofen (LIORESAL) 20 mg tablet TAKE 1 TABLET BY MOUTH THREE TIMES DAILY 90 Tab 0    cyclobenzaprine (FLEXERIL) 10 mg tablet Take 1 Tab by mouth three (3) times daily as needed for Muscle Spasm(s). 90 Tab 1     No current facility-administered medications on file prior to visit. Allergy:  No Known Allergies    Review of Systems  General ROS: positive for  - fatigue and sleep disturbance  negative for - chills, fever, hot flashes, malaise, night sweats, weight gain or weight loss  ENT ROS: negative for - epistaxis, hearing change, nasal congestion, nasal discharge, nasal polyps, oral lesions, sinus pain, sneezing, sore throat, tinnitus, vertigo, visual changes or vocal changes  Hematological and Lymphatic ROS: negative for - bleeding problems, blood clots, bruising, jaundice, pallor or swollen lymph nodes  Endocrine ROS: negative for - polydipsia/polyuria, skin changes, temperature intolerance or unexpected weight changes  Respiratory ROS: no cough,   or wheezing + episodes dyspnea at rest and with activity  Cardiovascular ROS: no chest pain or dyspnea on exertion- positive for report of palpitations with flushed red face feeling, rapid breathing, tunnel vision and ringing in ears  Gastrointestinal ROS: no abdominal pain, change in bowel habits, or black or bloody stools  Genito-Urinary ROS: no dysuria, trouble voiding, or hematuria  Musculoskeletal ROS: positive for - joint pain and muscle pain  Neurological ROS: no TIA or stroke symptoms  Dermatological ROS: negative for - pruritus, rash or skin lesion changes   Psychological ROS: as above   Otherwise negative. Physical Exam:  Blood pressure 129/81, pulse 77, temperature 98.2 °F (36.8 °C), temperature source Oral, resp. rate 18, height 5' 10\" (1.778 m), weight 74.5 kg (164 lb 3.2 oz), SpO2 99 %. on RA, Body mass index is 23.56 kg/m². General: No distress, acyanotic, appears stated age, cooperative, pleasant  HEENT: PERRL, EOMI, throat without erythema or exudate, Tongue- dental indention on tongue, Mallampati's score 2+, Uvula- midline, Tonsils- not seen.  Mouth is proportionally small  Neck: Supple,  no abnormally enlarged lymph nodes, thyroid is not enlarged, non-tender, no carotid bruits, no JVD  Chest: Normal.  Lungs: Moderate air entry, clear to auscultation bilaterally,   Heart: Regular rate and rhythm, S1S2 present, without murmur. Abdomen: Flate, bowel sounds normoactive, abdomen is soft without significant tenderness, or guarding. Extremity: Negative for cyanosis, edema, or clubbing. Skin: Skin color, texture, turgor normal. No rashes or lesions. Neurological: CN 2-12 grossly intact, normal muscle tone. Data Reviewed:  CBC:   Lab Results   Component Value Date/Time    WBC 11.5 07/15/2019 01:15 PM    HGB 14.8 07/15/2019 01:15 PM    HCT 43.4 07/15/2019 01:15 PM    PLATELET 485 03/19/7231 01:15 PM    MCV 89.3 07/15/2019 01:15 PM       BMP:   Lab Results   Component Value Date/Time    Sodium 139 07/15/2019 01:15 PM    Potassium 3.9 07/15/2019 01:15 PM    Chloride 107 07/15/2019 01:15 PM    CO2 27 07/15/2019 01:15 PM    Anion gap 5 07/15/2019 01:15 PM    Glucose 85 07/15/2019 01:15 PM    BUN 15 07/15/2019 01:15 PM    Creatinine 1.54 (H) 07/15/2019 01:15 PM    BUN/Creatinine ratio 10 (L) 07/15/2019 01:15 PM    GFR est AA >60 07/15/2019 01:15 PM    GFR est non-AA 51 (L) 07/15/2019 01:15 PM    Calcium 9.0 07/15/2019 01:15 PM        TSH:  Lab Results   Component Value Date/Time    TSH 0.42 07/15/2019 01:15 PM       Imaging:  [x]I have personally reviewed the patients radiographs section   No results found for this or any previous visit. No results found for this or any previous visit. Historical Sleep Testing Data: No Testing To Date.           Елена Barker DO, FCCP  Pulmonary, Sleep and Critical Care Medicine

## 2019-10-07 NOTE — PROGRESS NOTES
Luis Enrique Whiting presents today for   Chief Complaint   Patient presents with    Irregular Heart Beat    Shortness of Breath    Other     excessive daytime sleepiness       Is someone accompanying this pt? No     Is the patient using any DME equipment during OV? No     -DME Company n/a     Depression Screening:  3 most recent PHQ Screens 10/7/2019   Little interest or pleasure in doing things Not at all   Feeling down, depressed, irritable, or hopeless Not at all   Total Score PHQ 2 0       Learning Assessment:  Learning Assessment 2/26/2018   PRIMARY LEARNER Patient   HIGHEST LEVEL OF EDUCATION - PRIMARY LEARNER  2 YEARS OF COLLEGE   [facility]RIERS PRIMARY LEARNER NONE   CO-LEARNER CAREGIVER No   PRIMARY LANGUAGE ENGLISH   LEARNER PREFERENCE PRIMARY DEMONSTRATION   ANSWERED BY patient   RELATIONSHIP SELF       Abuse Screening:  Abuse Screening Questionnaire 2/26/2018   Do you ever feel afraid of your partner? N   Are you in a relationship with someone who physically or mentally threatens you? N   Is it safe for you to go home? Y       Fall Risk  No flowsheet data found. Coordination of Care:  1. Have you been to the ER, urgent care clinic since your last visit? Hospitalized since your last visit? No    2. Have you seen or consulted any other health care providers outside of the [address] [place] [address] since your last visit? Include any pap smears or colon screening.  No

## 2019-10-07 NOTE — LETTER
10/8/19 Patient: Jose E Rao YOB: 1985 Date of Visit: 10/7/2019 RUBA Mares 
[address] [facility] [address] [id number] VIA In Basket Dear RUBA Mares, Thank you for referring Mr. Jose E Rao to [address] [place] [address] for evaluation. My notes for this consultation are attached. If you have questions, please do not hesitate to call me. I look forward to following your patient along with you.  
 
 
Sincerely, 
 
Candy Gentle, DO

## 2020-12-22 ENCOUNTER — OFFICE VISIT (OUTPATIENT)
Dept: INTERNAL MEDICINE CLINIC | Age: 35
End: 2020-12-22
Payer: COMMERCIAL

## 2020-12-22 ENCOUNTER — HOSPITAL ENCOUNTER (OUTPATIENT)
Dept: LAB | Age: 35
Discharge: HOME | End: 2020-12-22
Payer: COMMERCIAL

## 2020-12-22 VITALS
OXYGEN SATURATION: 97 % | WEIGHT: 170 LBS | BODY MASS INDEX: 24.34 KG/M2 | RESPIRATION RATE: 20 BRPM | SYSTOLIC BLOOD PRESSURE: 124 MMHG | DIASTOLIC BLOOD PRESSURE: 77 MMHG | TEMPERATURE: 98.1 F | HEIGHT: 70 IN | HEART RATE: 96 BPM

## 2020-12-22 DIAGNOSIS — N18.2 CKD (CHRONIC KIDNEY DISEASE) STAGE 2, GFR 60-89 ML/MIN: ICD-10-CM

## 2020-12-22 DIAGNOSIS — M77.11 EPICONDYLITIS, LATERAL, RIGHT: Primary | ICD-10-CM

## 2020-12-22 LAB
ANION GAP SERPL CALC-SCNC: 4 MMOL/L (ref 3–18)
APPEARANCE UR: CLEAR
BILIRUB UR QL: NEGATIVE
BUN SERPL-MCNC: 17 MG/DL (ref 7–18)
BUN/CREAT SERPL: 16 (ref 12–20)
CALCIUM SERPL-MCNC: 9.2 MG/DL (ref 8.5–10.1)
CHLORIDE SERPL-SCNC: 107 MMOL/L (ref 100–111)
CO2 SERPL-SCNC: 29 MMOL/L (ref 21–32)
COLOR UR: YELLOW
CREAT SERPL-MCNC: 1.09 MG/DL (ref 0.6–1.3)
GLUCOSE SERPL-MCNC: 75 MG/DL (ref 74–99)
GLUCOSE UR STRIP.AUTO-MCNC: NEGATIVE MG/DL
HGB UR QL STRIP: NEGATIVE
KETONES UR QL STRIP.AUTO: NEGATIVE MG/DL
LEUKOCYTE ESTERASE UR QL STRIP.AUTO: NEGATIVE
NITRITE UR QL STRIP.AUTO: NEGATIVE
PH UR STRIP: 7 [PH] (ref 5–8)
POTASSIUM SERPL-SCNC: 4.1 MMOL/L (ref 3.5–5.5)
PROT UR STRIP-MCNC: NEGATIVE MG/DL
SODIUM SERPL-SCNC: 140 MMOL/L (ref 136–145)
SP GR UR REFRACTOMETRY: 1.02 (ref 1–1.03)
UROBILINOGEN UR QL STRIP.AUTO: 1 EU/DL (ref 0.2–1)

## 2020-12-22 PROCEDURE — 80048 BASIC METABOLIC PNL TOTAL CA: CPT

## 2020-12-22 PROCEDURE — 36415 COLL VENOUS BLD VENIPUNCTURE: CPT

## 2020-12-22 PROCEDURE — 81003 URINALYSIS AUTO W/O SCOPE: CPT

## 2020-12-22 PROCEDURE — 99214 OFFICE O/P EST MOD 30 MIN: CPT | Performed by: INTERNAL MEDICINE

## 2020-12-22 RX ORDER — IBUPROFEN 200 MG
TABLET ORAL
COMMUNITY

## 2020-12-22 NOTE — PROGRESS NOTES
Progress Note    Patient: Shawnee Philippe               Sex: male                  YOB: 1985      Age:  38 y.o.                    HPI:     Shawnee Philippe is a 38 y.o. male who has been seen for Rt arm pain . Present x 3 months. . It began as a pain in his elbow. He wakes up in pain and has to move it to stretch it out . He is a  for a construction company . He denies any injuries. He also has some pain in the Rt deltoid area    Past Medical History:   Diagnosis Date    Chronic pain     back       Past Surgical History:   Procedure Laterality Date    HX WISDOM TEETH EXTRACTION         Family History   Problem Relation Age of Onset    Hypertension Mother    [facility] Arthritis-osteo Mother     Hypertension Father     Arthritis-osteo Father     Arthritis-osteo Sister        Social History     Socioeconomic History    Marital status: SINGLE     Spouse name: Not on file    Number of children: Not on file    Years of education: Not on file    Highest education level: Not on file   Tobacco Use    Smoking status: Current Every Day Smoker     Packs/day: 1.00     Years: 20.00     Pack years: 20.00    Smokeless tobacco: Never Used   Substance and Sexual Activity    Alcohol use: Yes     Alcohol/week: 4.0 standard drinks     Types: 2 Glasses of wine, 2 Shots of liquor per week    Drug use: Yes     Frequency: 8.0 times per week     Types: Marijuana    Sexual activity: Not Currently         Current Outpatient Medications:     ibuprofen (MOTRIN) 200 mg tablet, Take  by mouth., Disp: , Rfl:     baclofen (LIORESAL) 20 mg tablet, TAKE 1 TABLET BY MOUTH THREE TIMES DAILY, Disp: 90 Tab, Rfl: 0    cyclobenzaprine (FLEXERIL) 10 mg tablet, Take 1 Tab by mouth three (3) times daily as needed for Muscle Spasm(s). , Disp: 90 Tab, Rfl: 1     No Known Allergies    Review of Systems   Constitutional: Negative for chills and fever. HENT: Negative for hearing loss. Eyes: Negative.     Respiratory: Negative for cough and shortness of breath. Cardiovascular: Negative for chest pain and PND. Gastrointestinal: Negative. Genitourinary: Negative. Musculoskeletal: Positive for joint pain. Neurological: Negative for dizziness and loss of consciousness. Psychiatric/Behavioral: Negative for depression. The patient is not nervous/anxious. Physical Exam:      Visit Vitals  /77 (BP 1 Location: Left arm, BP Patient Position: Sitting)   Pulse 96   Temp 98.1 °F (36.7 °C) (Oral)   Resp 20   Ht 5' 10\" (1.778 m)   Wt 170 lb (77.1 kg)   SpO2 97%   BMI 24.39 kg/m²       Physical Exam  Constitutional:       Appearance: Normal appearance. Cardiovascular:      Rate and Rhythm: Normal rate and regular rhythm. Pulmonary:      Effort: Pulmonary effort is normal.      Breath sounds: Normal breath sounds. Musculoskeletal:      Comments: Tender over Rt lateral epicondyle elbow   Neurological:      Mental Status: He is alert. Psychiatric:         Mood and Affect: Mood normal.         Behavior: Behavior normal.          Labs Reviewed:  Lab results reviewed. For significant abnormal values and values requiring intervention, see assessment and plan. Assessment/Plan       ICD-10-CM ICD-9-CM    1. Epicondylitis, lateral, right  M77.11 726.32 REFERRAL TO ORTHOPEDICS   2.  CKD (chronic kidney disease) stage 2, GFR 60-89 ml/min  N18.2 585.2 URINALYSIS W/ RFLX MICROSCOPIC      METABOLIC PANEL, BASIC   creatinine 1.5          Dustin Moran MD

## 2020-12-22 NOTE — PROGRESS NOTES
Caleb Styles is a 38 y.o. male (: 1985) presenting to address:    Chief Complaint   Patient presents with    Elbow Pain     patient c/o RT elbow pain x 3-4 months      pain scale 5/10       Vitals:    20 1449   BP: 124/77   Pulse: 96   Resp: 20   Temp: 98.1 °F (36.7 °C)   TempSrc: Oral   SpO2: 97%   Weight: 170 lb (77.1 kg)   Height: 5' 10\" (1.778 m)   PainSc:   5   PainLoc: Elbow       Hearing/Vision:   No exam data present    Learning Assessment:     Learning Assessment 2018   PRIMARY LEARNER Patient   HIGHEST LEVEL OF EDUCATION - PRIMARY LEARNER  2 YEARS OF COLLEGE   BARRIERS PRIMARY LEARNER NONE   CO-LEARNER CAREGIVER No   PRIMARY LANGUAGE ENGLISH   LEARNER PREFERENCE PRIMARY DEMONSTRATION   ANSWERED BY patient   RELATIONSHIP SELF     Depression Screening:     3 most recent PHQ Screens 2020   Little interest or pleasure in doing things Not at all   Feeling down, depressed, irritable, or hopeless Not at all   Total Score PHQ 2 0     Fall Risk Assessment:   No flowsheet data found. Abuse Screening:     Abuse Screening Questionnaire 2018   Do you ever feel afraid of your partner? N   Are you in a relationship with someone who physically or mentally threatens you? N   Is it safe for you to go home? Y     Coordination of Care Questionaire:   1. Have you been to the ER, urgent care clinic since your last visit? Hospitalized since your last visit? NO    2. Have you seen or consulted any other health care providers outside of the [address] [place] [address] since your last visit? Include any pap smears or colon screening. NO    Advanced Directive:   1. Do you have an Advanced Directive? NO    2. Would you like information on Advanced Directives?  NO

## 2021-06-24 ENCOUNTER — OFFICE VISIT (OUTPATIENT)
Dept: INTERNAL MEDICINE CLINIC | Age: 36
End: 2021-06-24
Payer: COMMERCIAL

## 2021-06-24 ENCOUNTER — HOSPITAL ENCOUNTER (OUTPATIENT)
Dept: LAB | Age: 36
Discharge: HOME | End: 2021-06-24
Payer: COMMERCIAL

## 2021-06-24 VITALS
OXYGEN SATURATION: 97 % | BODY MASS INDEX: 22.19 KG/M2 | DIASTOLIC BLOOD PRESSURE: 80 MMHG | HEIGHT: 70 IN | SYSTOLIC BLOOD PRESSURE: 118 MMHG | WEIGHT: 155 LBS | RESPIRATION RATE: 17 BRPM | HEART RATE: 88 BPM

## 2021-06-24 DIAGNOSIS — N18.1 STAGE 1 CHRONIC KIDNEY DISEASE: ICD-10-CM

## 2021-06-24 DIAGNOSIS — B34.9 VIRAL ILLNESS: Primary | ICD-10-CM

## 2021-06-24 LAB
ANION GAP SERPL CALC-SCNC: 4 MMOL/L (ref 3–18)
BUN SERPL-MCNC: 9 MG/DL (ref 7–18)
BUN/CREAT SERPL: 10 (ref 12–20)
CALCIUM SERPL-MCNC: 9.5 MG/DL (ref 8.5–10.1)
CHLORIDE SERPL-SCNC: 107 MMOL/L (ref 100–111)
CO2 SERPL-SCNC: 29 MMOL/L (ref 21–32)
CREAT SERPL-MCNC: 0.94 MG/DL (ref 0.6–1.3)
GLUCOSE SERPL-MCNC: 106 MG/DL (ref 74–99)
POTASSIUM SERPL-SCNC: 4 MMOL/L (ref 3.5–5.5)
SODIUM SERPL-SCNC: 140 MMOL/L (ref 136–145)

## 2021-06-24 PROCEDURE — 99213 OFFICE O/P EST LOW 20 MIN: CPT | Performed by: INTERNAL MEDICINE

## 2021-06-24 PROCEDURE — 36415 COLL VENOUS BLD VENIPUNCTURE: CPT

## 2021-06-24 PROCEDURE — 80048 BASIC METABOLIC PNL TOTAL CA: CPT

## 2021-06-24 NOTE — PROGRESS NOTES
Progress Note    Patient: Janet Madrid               Sex: male                  YOB: 1985      Age:  38 y.o.                    HPI:     Janet Madrid is a 38 y.o. male who has been seen for  questions re possible kidney disease. His girlfriend and his son  have had a fever . He has some scratchy throat and some diarrhea. His ortho MD expressed some concern about his taking  NSAIDS with his Dx of CKD . Past Medical History:   Diagnosis Date    Chronic pain     back       Past Surgical History:   Procedure Laterality Date    HX WISDOM TEETH EXTRACTION         Family History   Problem Relation Age of Onset    Hypertension Mother     Arthritis-osteo Mother     Hypertension Father     Arthritis-osteo Father     Arthritis-osteo Sister        Social History     Socioeconomic History    Marital status: SINGLE     Spouse name: Not on file    Number of children: Not on file    Years of education: Not on file    Highest education level: Not on file   Tobacco Use    Smoking status: Former Smoker     Packs/day: 1.00     Years: 20.00     Pack years: 20.00     Quit date: 3/24/2021     Years since quittin.2    Smokeless tobacco: Never Used    Tobacco comment: switch to e cig/vape   Vaping Use    Vaping Use: Every day    Substances: Nicotine   Substance and Sexual Activity    Alcohol use: Yes     Alcohol/week: 4.0 standard drinks     Types: 2 Glasses of wine, 2 Shots of liquor per week    Drug use: Yes     Frequency: 8.0 times per week     Types: Marijuana    Sexual activity: Not Currently     Social Determinants of Health     Financial Resource Strain:     Difficulty of Paying Living Expenses:    Food Insecurity:     Worried About Running Out of Food in the Last Year:     Ran Out of Food in the Last Year:    Transportation Needs:     Lack of Transportation (Medical):      Lack of Transportation (Non-Medical):    Physical Activity:     Days of Exercise per Week:     Minutes of Exercise per Session:    Stress:     Feeling of Stress :    Social Connections:     Frequency of Communication with Friends and Family:     Frequency of Social Gatherings with Friends and Family:     Attends Rastafarian Services:     Active Member of Clubs or Organizations:     Attends Club or Organization Meetings:     Marital Status:          Current Outpatient Medications:     ibuprofen (MOTRIN) 200 mg tablet, Take  by mouth., Disp: , Rfl:     baclofen (LIORESAL) 20 mg tablet, TAKE 1 TABLET BY MOUTH THREE TIMES DAILY (Patient not taking: Reported on 6/24/2021), Disp: 90 Tab, Rfl: 0    cyclobenzaprine (FLEXERIL) 10 mg tablet, Take 1 Tab by mouth three (3) times daily as needed for Muscle Spasm(s). (Patient not taking: Reported on 6/24/2021), Disp: 90 Tab, Rfl: 1     No Known Allergies    Review of Systems   Constitutional: Positive for chills. Negative for fever. Eyes: Negative for blurred vision. Cardiovascular: Negative for chest pain. Gastrointestinal: Positive for nausea. Genitourinary: Negative. Neurological: Negative for dizziness and loss of consciousness. Physical Exam:      Visit Vitals  BP (!) 146/78 (BP 1 Location: Right arm, BP Patient Position: Sitting)   Pulse 88   Resp 17   Ht 5' 10\" (1.778 m)   Wt 155 lb (70.3 kg)   SpO2 97%   BMI 22.24 kg/m²       Physical Exam  Constitutional:       Appearance: Normal appearance. HENT:      Head: Normocephalic. Mouth/Throat:      Mouth: Mucous membranes are moist.      Pharynx: Posterior oropharyngeal erythema present. Cardiovascular:      Rate and Rhythm: Normal rate and regular rhythm. Musculoskeletal:      Cervical back: No tenderness. Lymphadenopathy:      Cervical: No cervical adenopathy. Neurological:      Mental Status: He is alert.           Labs Reviewed:  12/22/2020  6:36 PM - José Antonio, Lab In [facility]    Component Value Flag Ref Range Units Status   Sodium 140   136 - 145 mmol/L Final   Potassium 4.1   3.5 - 5.5 mmol/L Final   Chloride 107   100 - 111 mmol/L Final   CO2 29   21 - 32 mmol/L Final   Anion gap 4   3.0 - 18 mmol/L Final   Glucose 75   74 - 99 mg/dL Final   BUN 17   7.0 - 18 MG/DL Final   Creatinine 1.09   0.6 - 1.3 MG/DL Final   BUN/Creatinine ratio 16   12 - 20   Final   GFR est AA >60   >60 ml/min/1.73m2 Final   GFR est non-AA >60   >60 ml/min/1.73m2 Final     Creatinine was 1.54 in 2019  . Possibly during an acute illness that resolved    Assessment/Plan       ICD-10-CM ICD-9-CM    1. Viral illness  B34.9 079.99    2. Stage 1 chronic kidney disease  O77.7 199.4 METABOLIC PANEL, BASIC       I advised that if he wants Covid testing he should go to [facility] or [facility] . Will get  repeat  BMP in reference to question re CKD Dx .  Call if viral illness symptoms progress       Stephani Hollis MD

## 2021-06-24 NOTE — PROGRESS NOTES
ROOM # [id number]    Stanton Staton presents today for   Chief Complaint   Patient presents with   [address]     last ov for RT arm pain in 12/2020 which has improved with cortisone injection. Today is concern about dx CKD, stge 2 from labs done in 2019    Cough     chest congestio, sore/dry throat x days       Stanton Staton preferred language for health care discussion is english/other. Is someone accompanying this pt? NO    Is the patient using any DME equipment during OV? NO    Depression Screening:  3 most recent [facility] Screens 6/24/2021 12/22/2020 10/7/2019 7/15/2019 6/11/2018 3/12/2018 2/26/2018   Little interest or pleasure in doing things Not at all Not at all Not at all Not at all Not at all Several days Not at all   Feeling down, depressed, irritable, or hopeless Not at all Not at all Not at all Not at all Not at all Several days Not at all   Total Score PHQ 2 0 0 0 0 0 2 0       Learning Assessment:  Learning Assessment 2/26/2018   PRIMARY LEARNER Patient   HIGHEST LEVEL OF EDUCATION - PRIMARY LEARNER  2 YEARS OF COLLEGE   BARRIERS PRIMARY LEARNER NONE   CO-LEARNER CAREGIVER No   PRIMARY LANGUAGE ENGLISH   LEARNER PREFERENCE PRIMARY DEMONSTRATION   ANSWERED BY patient   RELATIONSHIP SELF       Abuse Screening:  Abuse Screening Questionnaire 2/26/2018   Do you ever feel afraid of your partner? N   Are you in a relationship with someone who physically or mentally threatens you? N   Is it safe for you to go home? Y       Fall Risk  No flowsheet data found. Health Maintenance reviewed and discussed per provider. Yes    Stanton Staton is due for   Health Maintenance Due   Topic Date Due    Hepatitis C Screening  Never done    COVID-19 Vaccine (1) Never done         Please order/place referral if appropriate. Advance Directive:  1. Do you have an advance directive in place? Patient Reply: NO    2. If not, would you like material regarding how to put one in place?  Patient Reply: NO    Coordination of Care:  1. Have you been to the ER, urgent care clinic since your last visit? Hospitalized since your last visit? NO    2. Have you seen or consulted any other health care providers outside of the [address] [place] [address] since your last visit? Include any pap smears or colon screening.  YES-Ortho for joint inj in March 2021

## 2021-06-29 ENCOUNTER — TELEPHONE (OUTPATIENT)
Dept: INTERNAL MEDICINE CLINIC | Age: 36
End: 2021-06-29